# Patient Record
Sex: FEMALE | Race: ASIAN | Employment: UNEMPLOYED | URBAN - METROPOLITAN AREA
[De-identification: names, ages, dates, MRNs, and addresses within clinical notes are randomized per-mention and may not be internally consistent; named-entity substitution may affect disease eponyms.]

---
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
## 2021-06-02 VITALS — HEIGHT: 59 IN | BODY MASS INDEX: 18.75 KG/M2 | WEIGHT: 93 LBS

## 2021-06-02 VITALS — BODY MASS INDEX: 18.78 KG/M2 | WEIGHT: 93 LBS

## 2021-06-21 NOTE — PROGRESS NOTES
Chief Complaint   Patient presents with     Consult     patient in to talk about right breast lump x1 month not painful

## 2021-06-21 NOTE — PROGRESS NOTES
History:  This is a 18 y.o.  female who I'm asked to see for evaluation of a right breast mass.  This was picked up by the patient.  The patient first felt the mass about 1 month ago.  It has not changed in this timeframe.  It has not caused her any discomfort.  She denies any other breast changes including skin or nipple.    PAST MEDICAL HISTORY:  Denies    PAST SURGICAL HISTORY:  Denies    Medications:  Denies    Allergies:  No Known Allergies    Social History:   reports that she has never smoked. She has never used smokeless tobacco.  Does not utilize alcohol or illicit drugs.    Family History:  She has no family history of breast cancer.    ROS:  General: No complaints or constitutional symptoms  Skin: No complaints or symptoms   Hematologic/Lymphatic: No symptoms or complaints  Psychiatric: No symptoms or complaints  Endocrine: No excessive fatigue, no hypermetabolic symptoms reported  Respiratory: No cough, shortness of breath, or wheezing  Cardiovascular: No chest pain or dyspnea on exertion  Breast: Palpable mass to right breast  Gastrointestinal: No abdominal pain, nausea, diarrhea, or constipation  Musculoskeletal: No recent injuries reported  Neurological: No focal neurologic defects reported.      PHYSICAL EXAM  /60 (Patient Site: Left Arm, Patient Position: Sitting, Cuff Size: Adult Regular)  Pulse 85  Wt (!) 93 lb (42.2 kg)  SpO2 99%  Breastfeeding? No  There is no height or weight on file to calculate BMI.  General: Alert, cooperative, appears stated age   Skin: Skin color, texture, turgor normal, no rashes or lesions   Lymphatic: No obvious adenopathy, no swelling   Eyes: No scleral icterus, pupils equal  HENT: No traumatic injury to the head or face, no gross abnormalities  Lungs: Normal respiratory effort, breath sounds equal bilaterally  Heart: Regular rate and rhythm  Breasts: Symmetrical, no palpable lymphadenopathy.  Left breast 12:00 zone 2 with 5 mm palpable lesion.  Right breast  7:00 zone 2 with very mobile 2 cm mass.  Abdomen: Soft, non-distended and non-tender to palpation  Neurologic: Grossly intact    IMAGING  US BREAST LIMITED (FOCAL) RIGHT   10/5/2018 8:53 AM     INDICATION: Unspecified lump in unspecified breast.  COMPARISON: None.     ULTRASOUND FINDINGS: Targeted right breast ultrasound was performed by both the ultrasonographer and the radiologist in the location of the lump as identified by the patient. In this location, there was a hypoechoic circumscribed lesion measuring 2.5 x   1.4 x 2 cm. It most likely represents a benign fibroadenoma. However, biopsy is recommended to confirm this.     IMPRESSION:   Mass in the right breast likely represents a benign fibroadenoma. However, there is some overlap in the appearance of benign fibroadenomas with some breast cancers and biopsy is recommended. Alternatives of ultrasound-guided core biopsy versus excision   were discussed with the patient. She will consider these options further and discuss them with her primary physician. The findings and recommendations were discussed with Dr. Mendez's partner, Dr. Rodriguez, at the time of dictation.     ACR BI-RADS Category 4A: Suspicious. Low Suspicion for Malignancy.    PATHOLOGY  None    IMPRESSION:  Right breast mass, consistent with fibroadenoma.      PLAN:   Discussed the treatment options for a likely fibroadenoma, including observation, core needle biopsy, or excisional biopsy.  The patient would like the lesion removed.  This is typically an outpatient procedure under local MAC anesthetic.  The risks and benefits of surgery were explained.  Also talked about expected recovery time.  Further treatment will be dependent upon the final pathology.  Surgery will be scheduled at her earliest convenience.      Anu Morgan, [facility]  [phone]

## 2021-06-21 NOTE — ANESTHESIA POSTPROCEDURE EVALUATION
Patient: Shannan Hauser  RIGHT BREAST EXCISIONAL BIOPSY  Anesthesia type: MAC    Patient location: Phase II Recovery  Last vitals:   Vitals:    11/09/18 1300   BP: 105/61   Pulse: 64   Resp: 16   Temp:    SpO2:      Post vital signs: stable  Level of consciousness: awake and responds to simple questions  Post-anesthesia pain: pain controlled  Post-anesthesia nausea and vomiting: no  Pulmonary: unassisted, return to baseline  Cardiovascular: stable and blood pressure at baseline  Hydration: adequate  Anesthetic events: no    QCDR Measures:  ASA# 11 - Monica-op Cardiac Arrest: ASA11B - Patient did NOT experience unanticipated cardiac arrest  ASA# 12 - Monica-op Mortality Rate: ASA12B - Patient did NOT die  ASA# 13 - PACU Re-Intubation Rate: ASA13B - Patient did NOT require a new airway mgmt  ASA# 10 - Composite Anes Safety: ASA10A - No serious adverse event    Additional Notes:

## 2021-06-21 NOTE — ANESTHESIA PREPROCEDURE EVALUATION
Anesthesia Evaluation      Patient summary reviewed   No history of anesthetic complications     Airway   Mallampati: II  Neck ROM: full   Pulmonary - negative ROS and normal exam                          Cardiovascular - negative ROS and normal exam   Neuro/Psych - negative ROS     Endo/Other - negative ROS      GI/Hepatic/Renal - negative ROS           Dental - normal exam                        Anesthesia Plan  Planned anesthetic: MAC    ASA 1   Induction: intravenous   Anesthetic plan and risks discussed with: patient, parent/guardian and  services used  Anesthesia plan special considerations: antiemetics,   Post-op plan: routine recovery        UPT pending

## 2021-06-21 NOTE — ANESTHESIA CARE TRANSFER NOTE
Last vitals:   Vitals:    11/09/18 1236   BP: 105/60   Pulse: 77   Resp: 16   Temp: 36.2  C (97.1  F)   SpO2: 100%     Patient's level of consciousness is drowsy  Spontaneous respirations: yes  Maintains airway independently: yes  Dentition unchanged: yes  Oropharynx: oropharynx clear of all foreign objects    QCDR Measures:  ASA# 20 - Surgical Safety Checklist: WHO surgical safety checklist completed prior to induction  PQRS# 430 - Adult PONV Prevention: 4558F - Pt received => 2 anti-emetic agents (different classes) preop & intraop  ASA# 8 - Peds PONV Prevention: NA - Not pediatric patient, not GA or 2 or more risk factors NOT present  PQRS# 424 - Monica-op Temp Management: 4559F - At least one body temp DOCUMENTED => 35.5C or 95.9F within required timeframe  PQRS# 426 - PACU Transfer Protocol: - Transfer of care checklist used  ASA# 14 - Acute Post-op Pain: ASA14B - Patient did NOT experience pain >= 7 out of 10

## 2021-07-03 NOTE — ADDENDUM NOTE
Addendum Note by Barbara Mkcoy CMA at 10/29/2018 11:36 AM     Author: Barbara Mckoy CMA Service: -- Author Type: Certified Medical Assistant    Filed: 10/29/2018 11:36 AM Date of Service: 10/29/2018 11:36 AM Status: Signed    : Barbara Mckoy CMA (Certified Medical Assistant)    Encounter addended by: Barbara Mckoy CMA on: 10/29/2018 11:36 AM<BR>     Actions taken: Sign clinical note, Charge Capture section accepted